# Patient Record
Sex: FEMALE | Race: OTHER | Employment: UNEMPLOYED | URBAN - METROPOLITAN AREA
[De-identification: names, ages, dates, MRNs, and addresses within clinical notes are randomized per-mention and may not be internally consistent; named-entity substitution may affect disease eponyms.]

---

## 2020-12-15 ENCOUNTER — OFFICE VISIT (OUTPATIENT)
Dept: PRIMARY CARE CLINIC | Age: 65
End: 2020-12-15
Payer: COMMERCIAL

## 2020-12-15 VITALS
WEIGHT: 185 LBS | BODY MASS INDEX: 27.4 KG/M2 | SYSTOLIC BLOOD PRESSURE: 128 MMHG | HEART RATE: 71 BPM | DIASTOLIC BLOOD PRESSURE: 72 MMHG | OXYGEN SATURATION: 98 % | HEIGHT: 69 IN | TEMPERATURE: 98.4 F

## 2020-12-15 DIAGNOSIS — J06.9 VIRAL URI: ICD-10-CM

## 2020-12-15 DIAGNOSIS — Z20.822 EXPOSURE TO COVID-19 VIRUS: ICD-10-CM

## 2020-12-15 LAB
Lab: NORMAL
QC PASS/FAIL: NORMAL
SARS-COV-2, POC: NORMAL

## 2020-12-15 PROCEDURE — 99213 OFFICE O/P EST LOW 20 MIN: CPT | Performed by: PHYSICIAN ASSISTANT

## 2020-12-15 PROCEDURE — 3017F COLORECTAL CA SCREEN DOC REV: CPT | Performed by: PHYSICIAN ASSISTANT

## 2020-12-15 PROCEDURE — 87426 SARSCOV CORONAVIRUS AG IA: CPT | Performed by: PHYSICIAN ASSISTANT

## 2020-12-15 PROCEDURE — G8419 CALC BMI OUT NRM PARAM NOF/U: HCPCS | Performed by: PHYSICIAN ASSISTANT

## 2020-12-15 PROCEDURE — 1036F TOBACCO NON-USER: CPT | Performed by: PHYSICIAN ASSISTANT

## 2020-12-15 PROCEDURE — G8427 DOCREV CUR MEDS BY ELIG CLIN: HCPCS | Performed by: PHYSICIAN ASSISTANT

## 2020-12-15 PROCEDURE — G8484 FLU IMMUNIZE NO ADMIN: HCPCS | Performed by: PHYSICIAN ASSISTANT

## 2020-12-15 NOTE — PROGRESS NOTES
Chief Complaint   Headache (x 5 days), Pharyngitis (positive coworkers), Generalized Body Aches, and Chills      History of Present Illness   Source of history provided by: patient. Criss King is a 60 y.o. old female who has a past medical history of:   Past Medical History:   Diagnosis Date    Hyperlipidemia     Presents to the flu clinic for evaluation of headache, sore throat, generalized body aches, and occasional chills x5 days. Patient is mostly concerned because she was recently exposed to multiple coworkers who tested positive for COVID-19. Patient has been taking TheraFlu at home with symptomatic relief. Denies any fever, loss of taste or smell, CP, dyspnea, LE edema, abdominal pain, vomiting, rash, or lethargy. Denies any hx of asthma, tobacco use, or COPD. ROS   Pertinent positives and negatives are stated within HPI, all other systems reviewed and are negative. Surgical History:  has a past surgical history that includes Cholecystectomy and Hysterectomy. Social History:  reports that she has never smoked. She has never used smokeless tobacco. She reports that she does not use drugs. Family History: family history is not on file. Allergies: Iodine and Pollen extract    Physical Exam      VS:  /72   Pulse 71   Temp 98.4 °F (36.9 °C)   Ht 5' 9\" (1.753 m)   Wt 185 lb (83.9 kg)   SpO2 98%   BMI 27.32 kg/m²    Oxygen Saturation Interpretation: Normal.    Constitutional:  Alert, development consistent with age. NAD. Head:  NC/NT. Airway patent. Mouth: Posterior pharynx with mild erythema and clear postnasal drip. No tonsillar hypertrophy or exudate. Neck:  Normal ROM. Supple. No anterior cervical adenopathy noted. Lungs: CTAB without wheezes, rales, or rhonchi. CV:  Regular rate and rhythm, normal heart sounds, without pathological murmurs, ectopy, gallops, or rubs. Skin:  Normal turgor. Warm, dry, without visible rash. Lymphatic: No lymphangitis or adenopathy noted. Neurological:  Oriented. Motor functions intact. Lab / Imaging Results   (All laboratory and radiology results have been personally reviewed by myself)  Labs:  No results found for this visit on 12/15/20. Imaging: All Radiology results interpreted by Radiologist unless otherwise noted. No results found. Medical Decision Making   Pt non-toxic, in no apparent distress and stable at time of discharge. Assessment/Plan   Nury Lisa was seen today for headache, pharyngitis, generalized body aches and chills. Diagnoses and all orders for this visit:    Viral URI  -     POCT COVID-19, Antigen  -     COVID-19 Ambulatory; Future    Exposure to COVID-19 virus  -     POCT COVID-19, Antigen  -     COVID-19 Ambulatory; Future      Rapid COVID-19 testing is negative in office. Confirmatory PCR swab obtained and pending, will call with results once available. Advised cautionary self-quarantine at home in the interim. Pt should remain out of work and the general public until results are confirmed negative. Pt should also be fever free for 24 hours and symptoms should be improved overall prior to returning. Increase fluids and rest. Symptomatic relief discussed including Tylenol prn pain/fever. Schedule virtual f/u with PCP in 7-10 days if symptoms persist. ED sooner if symptoms worsen or change. ED immediately with high or refractory fever, progressive SOB, dyspnea, CP, calf pain/swelling, shaking chills, vomiting, abdominal pain, lethargy, flank pain, or decreased urinary output. Pt verbalizes understanding and is in agreement with plan of care. All questions answered. Khushbu Dow PA-C    This visit was provided as a focused evaluation during the COVID -19 pandemic/national emergency. A comprehensive review of all previous patient history and testing was not conducted. Pertinent findings were elicited during the visit.

## 2020-12-16 LAB
SARS-COV-2: NOT DETECTED
SOURCE: NORMAL

## 2021-09-27 ENCOUNTER — APPOINTMENT (OUTPATIENT)
Dept: GENERAL RADIOLOGY | Age: 66
End: 2021-09-27
Payer: COMMERCIAL

## 2021-09-27 ENCOUNTER — HOSPITAL ENCOUNTER (EMERGENCY)
Age: 66
Discharge: HOME | End: 2021-09-27
Attending: EMERGENCY MEDICINE
Payer: COMMERCIAL

## 2021-09-27 VITALS
SYSTOLIC BLOOD PRESSURE: 138 MMHG | OXYGEN SATURATION: 98 % | TEMPERATURE: 98 F | RESPIRATION RATE: 16 BRPM | HEART RATE: 72 BPM | DIASTOLIC BLOOD PRESSURE: 88 MMHG | HEIGHT: 69 IN | BODY MASS INDEX: 29.62 KG/M2 | WEIGHT: 200 LBS

## 2021-09-27 DIAGNOSIS — S40.021A CONTUSION OF MULTIPLE SITES OF RIGHT SHOULDER AND UPPER ARM, INITIAL ENCOUNTER: ICD-10-CM

## 2021-09-27 DIAGNOSIS — S63.501A SPRAIN OF RIGHT WRIST, INITIAL ENCOUNTER: ICD-10-CM

## 2021-09-27 DIAGNOSIS — S40.011A CONTUSION OF MULTIPLE SITES OF RIGHT SHOULDER AND UPPER ARM, INITIAL ENCOUNTER: ICD-10-CM

## 2021-09-27 DIAGNOSIS — S93.401A SPRAIN OF RIGHT ANKLE, UNSPECIFIED LIGAMENT, INITIAL ENCOUNTER: Primary | ICD-10-CM

## 2021-09-27 PROCEDURE — 99283 EMERGENCY DEPT VISIT LOW MDM: CPT

## 2021-09-27 PROCEDURE — 6370000000 HC RX 637 (ALT 250 FOR IP): Performed by: EMERGENCY MEDICINE

## 2021-09-27 PROCEDURE — 73090 X-RAY EXAM OF FOREARM: CPT

## 2021-09-27 PROCEDURE — 73610 X-RAY EXAM OF ANKLE: CPT

## 2021-09-27 PROCEDURE — 73110 X-RAY EXAM OF WRIST: CPT

## 2021-09-27 PROCEDURE — 73030 X-RAY EXAM OF SHOULDER: CPT

## 2021-09-27 PROCEDURE — 73590 X-RAY EXAM OF LOWER LEG: CPT

## 2021-09-27 RX ORDER — IBUPROFEN 800 MG/1
800 TABLET ORAL ONCE
Status: COMPLETED | OUTPATIENT
Start: 2021-09-27 | End: 2021-09-27

## 2021-09-27 RX ORDER — HYDROCHLOROTHIAZIDE 25 MG/1
25 TABLET ORAL DAILY
COMMUNITY

## 2021-09-27 RX ADMIN — IBUPROFEN 800 MG: 800 TABLET, FILM COATED ORAL at 21:34

## 2021-09-27 ASSESSMENT — PAIN DESCRIPTION - ORIENTATION: ORIENTATION: RIGHT

## 2021-09-27 ASSESSMENT — PAIN DESCRIPTION - LOCATION: LOCATION: ANKLE

## 2021-09-27 ASSESSMENT — PAIN SCALES - GENERAL
PAINLEVEL_OUTOF10: 9
PAINLEVEL_OUTOF10: 8

## 2021-09-28 NOTE — ED PROVIDER NOTES
HPI:  9/27/21, Time: 9:17 PM EDT         Lucia Massey is a 73 y.o. female presenting to the ED for right ankle pain and swelling, right wrist pain and swelling and entire right arm pain s/p mechanical fall while walking out of her doctor's office about 7 hours ago. Reports she got tripped up in her long purse strap and fell onto her right side. She denies head trauma, loss of consciousness, neck or back pain, lacerations, ecchymosis or abrasions, focal paresthesias or focal weakness. She has been ambulatory. She has not taken anything for pain. The complaint has been persistent, moderate in severity, and worsened by movement. Patient denies any other injury or complaints. ROS:   A complete review of systems was performed and all pertinent positives and negatives are stated within HPI, all other systems reviewed and are negative.      --------------------------------------------- PAST HISTORY ---------------------------------------------  Past Medical History:  has a past medical history of Hyperlipidemia and Water retention. Past Surgical History:  has a past surgical history that includes Cholecystectomy and Hysterectomy. Social History:  reports that she has never smoked. She has never used smokeless tobacco. She reports that she does not use drugs. Family History: family history is not on file. The patients home medications have been reviewed. Allergies: Iodine and Pollen extract        ----------------------------------------PHYSICAL EXAM--------------------------------------  Constitutional:  Well developed, well nourished, no acute distress, non-toxic appearance   Eyes:  PERRL, conjunctiva normal, EOMI  HENT:  Atraumatic, external ears normal, nose normal, oropharynx moist, dentition intact.  Neck- normal range of motion, no nuchal rigidity   Respiratory:  No respiratory distress, normal breath sounds, no rales, no wheezing   Cardiovascular:  Normal rate, normal rhythm, no murmurs, no gallops, no rubs. Radial, PT and DP pulses 2+ bilaterally. Livingston's are soft. She is warm and well perfused. Cap refill less than 3 seconds. GI:  Soft, nondistended, normal bowel sounds, nontender, no rebound, no guarding   :  No costovertebral angle tenderness   Musculoskeletal:  No edema, no tenderness, no deformities. Back-no midline or paraspinal cervical, thoracic or lumbar tenderness. No step-offs. Chest stable. Pelvis stable. Moves all 4 extremities without difficulty or pain. Patient has some swelling and tenderness to the lateral malleolus of the right ankle without limitation in range of motion. She has some tenderness to palpation diffusely of the right wrist but no snuffbox tenderness or pain with axial loading of the right thumb. She has full range of motion of the right wrist hand MCP joints, PIP joints and DIP joints of right hand without difficulty or pain. She moves right elbow and shoulder and both left upper and left lower extremities, right hip and right knee without difficulty or pain. Integument:  Well hydrated, no visible rash. Adequate perfusion. No lacerations, abrasions or ecchymosis noted. Neurologic:  Alert & oriented x 3, CN 2-12 normal, no focal deficits noted. DTRs 2+ bilateral patellar. Normal gait. Right median, radial and ulnar nerves sensation intact to light touch and strength 5/5. Recurrent branch of right median nerve intact. Right axillary nerve intact. Psychiatric:  Speech and behavior appropriate       -------------------------------------------------- RESULTS -------------------------------------------------  I have personally reviewed all laboratory and imaging results for this patient. Results are listed below. LABS:  No results found for this visit on 09/27/21. RADIOLOGY:  Interpreted by Radiologist.  XR ANKLE RIGHT (MIN 3 VIEWS)   Final Result   1. No evidence of acute osseous abnormality. 2. Soft tissue swelling.          XR TIBIA FIBULA RIGHT (2 VIEWS)   Final Result   No evidence of acute osseous abnormality. XR SHOULDER RIGHT (MIN 2 VIEWS)   Final Result   1. No acute osseous findings about the right shoulder, right forearm, nor   right wrist on these exams. 2.  Mild right shoulder degenerative changes. 3.  Mild soft tissue swelling about the right wrist.      RECOMMENDATION:   In the setting of trauma, if there is persistent symptoms and physical exam   warrants a repeat radiograph in 10-14 days could be considered as occult   fractures may not be evident on initial imaging evaluation. XR RADIUS ULNA RIGHT (2 VIEWS)   Final Result   1. No acute osseous findings about the right shoulder, right forearm, nor   right wrist on these exams. 2.  Mild right shoulder degenerative changes. 3.  Mild soft tissue swelling about the right wrist.      RECOMMENDATION:   In the setting of trauma, if there is persistent symptoms and physical exam   warrants a repeat radiograph in 10-14 days could be considered as occult   fractures may not be evident on initial imaging evaluation. XR WRIST RIGHT (MIN 3 VIEWS)   Final Result   1. No acute osseous findings about the right shoulder, right forearm, nor   right wrist on these exams. 2.  Mild right shoulder degenerative changes. 3.  Mild soft tissue swelling about the right wrist.      RECOMMENDATION:   In the setting of trauma, if there is persistent symptoms and physical exam   warrants a repeat radiograph in 10-14 days could be considered as occult   fractures may not be evident on initial imaging evaluation.             ------------------------- NURSING NOTES AND VITALS REVIEWED ---------------------------  The nursing notes within the ED encounter and vital signs as below have been reviewed by myself.     BP (!) 142/96   Pulse 75   Temp 98 °F (36.7 °C) (Temporal)   Resp 16   Ht 5' 9\" (1.753 m)   Wt 200 lb (90.7 kg)   SpO2 98%   BMI 29.53 kg/m² Oxygen Saturation Interpretation: Normal      The patients available past medical records and past encounters were reviewed. ------------------------------ ED COURSE/MEDICAL DECISION MAKING----------------------  Medications   ibuprofen (ADVIL;MOTRIN) tablet 800 mg (800 mg Oral Given 9/27/21 2134)           Procedures:   none      Medical Decision Making:    Ambulates without issue. Provide Ace wrap and postop shoe for right ankle and Velcro wrist splint for right wrist as it is unlikely that she would be able to use crutches for the right ankle sprain with her right wrist in a Velcro wrist splint. Will provide Motrin here. She is neurovascularly intact and ambulatory upon discharge. Driving restrictions reviewed. Follow-up with PCP in 1 week if still symptomatic to rule out occult fracture. Patient was explicitly instructed on specific signs and symptoms on which to return to the emergency room for. Patient was instructed to return to the ER for any new or worsening symptoms. Additional discharge instructions were given verbally. All questions were answered. Patient is comfortable and agreeable with discharge plan. Patient in no acute distress and non-toxic in appearance. This patient's ED course included: a personal history and physicial eaxmination    This patient has remained hemodynamically stable during their ED course. Za Greene DO, am the Primary Provider of Record    Counseling: The emergency provider has spoken with the patient and discussed todays results, in addition to providing specific details for the plan of care and counseling regarding the diagnosis and prognosis. Questions are answered at this time and they are agreeable with the plan.    --------------------------- IMPRESSION AND DISPOSITION ---------------------------------    IMPRESSION  1. Sprain of right ankle, unspecified ligament, initial encounter    2. Sprain of right wrist, initial encounter    3. Contusion of multiple sites of right shoulder and upper arm, initial encounter        DISPOSITION  Disposition: Discharge to home  Patient condition is stable             Mellisa Sanders DO  09/27/21 [id number]